# Patient Record
Sex: MALE | Race: WHITE | NOT HISPANIC OR LATINO
[De-identification: names, ages, dates, MRNs, and addresses within clinical notes are randomized per-mention and may not be internally consistent; named-entity substitution may affect disease eponyms.]

---

## 2021-05-28 ENCOUNTER — DASHBOARD ENCOUNTERS (OUTPATIENT)
Age: 76
End: 2021-05-28

## 2021-05-28 ENCOUNTER — TELEPHONE ENCOUNTER (OUTPATIENT)
Dept: URBAN - METROPOLITAN AREA CLINIC 12 | Facility: CLINIC | Age: 76
End: 2021-05-28

## 2021-05-28 ENCOUNTER — OFFICE VISIT (OUTPATIENT)
Dept: URBAN - METROPOLITAN AREA CLINIC 12 | Facility: CLINIC | Age: 76
End: 2021-05-28
Payer: COMMERCIAL

## 2021-05-28 DIAGNOSIS — Z12.11 COLON CANCER SCREENING: ICD-10-CM

## 2021-05-28 DIAGNOSIS — Z95.0 PACEMAKER: ICD-10-CM

## 2021-05-28 DIAGNOSIS — K59.09 CHRONIC CONSTIPATION: ICD-10-CM

## 2021-05-28 DIAGNOSIS — K57.30 DIVERTICULA OF COLON: ICD-10-CM

## 2021-05-28 PROBLEM — 441509002: Status: ACTIVE | Noted: 2021-05-28

## 2021-05-28 PROBLEM — 236069009: Status: ACTIVE | Noted: 2021-05-28

## 2021-05-28 PROBLEM — 733657002: Status: ACTIVE | Noted: 2021-05-27

## 2021-05-28 PROBLEM — 305058001: Status: ACTIVE | Noted: 2021-05-28

## 2021-05-28 PROCEDURE — 99204 OFFICE O/P NEW MOD 45 MIN: CPT | Performed by: INTERNAL MEDICINE

## 2021-05-28 RX ORDER — POLYETHYLENE GLYCOL 3350, SODIUM SULFATE, SODIUM CHLORIDE, POTASSIUM CHLORIDE, ASCORBIC ACID, SODIUM ASCORBATE 140-9-5.2G
AS DIRECTED KIT ORAL
Qty: 1 BOX | Refills: 0 | OUTPATIENT
Start: 2021-05-28 | End: 2021-05-30

## 2021-05-28 RX ORDER — SODIUM, POTASSIUM,MAG SULFATES 17.5-3.13G
344MLL AS DIRECTED SOLUTION, RECONSTITUTED, ORAL ORAL
Qty: 1 KIT | Refills: 0 | OUTPATIENT
Start: 2021-05-28 | End: 2021-05-30

## 2021-05-28 NOTE — HPI-TODAY'S VISIT:
75 yo gentleman presents for GI evaluation. He sees Dr. Hyman for primary care.  He had sigmoid and transverse colon diverticula noted by Dr. Caruso in 2011 with a fair bowel preparation reported. Patient states that he has mild chronic constipation but does not require laxative. He has a bowel movement on most days but some days would go 2-3 days at a bowel movement. There's been no nausea or vomiting. There's been no early satiety. There has been no heartburn. There has been no dysphagia or odynophagia. Has been no appetite problems.  He does see his primary care provider regularly. He states that lab try tests have been unremarkable and there's been no anemia.   He is on a high fiber diet. There's been no abdominal pain. There's been no weight loss. There has been no change in bowel habits recently. This been no hematochezia or melena. There has been no fever chills or sweats. There has been no diverticulitis noted.  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 He has interrogation regularly and sees the cardiologist regularly. In fact just this past week he had had an interrogation performed. He sees Dr. Pina Black.  There has been no chest pain or shortness of breath or fatigue or palpitations. He does do bike riding and stays active. He states that he was advised to get a colonoscopy by his PCP.